# Patient Record
Sex: MALE | Race: WHITE | NOT HISPANIC OR LATINO | URBAN - METROPOLITAN AREA
[De-identification: names, ages, dates, MRNs, and addresses within clinical notes are randomized per-mention and may not be internally consistent; named-entity substitution may affect disease eponyms.]

---

## 2017-09-08 ENCOUNTER — FOLLOW UP ESTABLISHED (OUTPATIENT)
Dept: URBAN - METROPOLITAN AREA CLINIC 85 | Facility: CLINIC | Age: 82
End: 2017-09-08
Payer: COMMERCIAL

## 2017-09-08 DIAGNOSIS — H34.12 CENTRAL RETINAL ARTERY OCCLUSION, LEFT EYE: ICD-10-CM

## 2017-09-08 DIAGNOSIS — H04.123 TEAR FILM INSUFFICIENCY OF BILATERAL LACRIMAL GLANDS: ICD-10-CM

## 2017-09-08 DIAGNOSIS — H35.3132 NONEXUDATIVE MACULAR DEGENERATION, INTERMEDIATE DRY STAGE, BILATERAL: Primary | ICD-10-CM

## 2017-09-08 PROCEDURE — 92014 COMPRE OPH EXAM EST PT 1/>: CPT | Performed by: OPTOMETRIST

## 2017-09-08 PROCEDURE — 92134 CPTRZ OPH DX IMG PST SGM RTA: CPT | Performed by: OPTOMETRIST

## 2017-09-08 ASSESSMENT — INTRAOCULAR PRESSURE
OS: 15
OD: 15

## 2017-09-08 ASSESSMENT — VISUAL ACUITY
OD: 20/25
OS: 20/CF 4'

## 2021-03-12 ENCOUNTER — NEW PATIENT (OUTPATIENT)
Dept: URBAN - METROPOLITAN AREA CLINIC 85 | Facility: CLINIC | Age: 86
End: 2021-03-12
Payer: COMMERCIAL

## 2021-03-12 DIAGNOSIS — H02.053 TRICHIASIS WITHOUT ENTROPION RIGHT EYE, UNSPECIFIED EYELID: ICD-10-CM

## 2021-03-12 PROCEDURE — 92134 CPTRZ OPH DX IMG PST SGM RTA: CPT | Performed by: OPHTHALMOLOGY

## 2021-03-12 PROCEDURE — 92014 COMPRE OPH EXAM EST PT 1/>: CPT | Performed by: OPHTHALMOLOGY

## 2021-03-12 ASSESSMENT — VISUAL ACUITY
OS: 20/HM
OD: 20/40

## 2021-03-12 ASSESSMENT — INTRAOCULAR PRESSURE
OD: 15
OS: 15

## 2021-03-19 ENCOUNTER — ADULT PHYSICAL (OUTPATIENT)
Dept: URBAN - METROPOLITAN AREA CLINIC 85 | Facility: CLINIC | Age: 86
End: 2021-03-19
Payer: COMMERCIAL

## 2021-03-19 DIAGNOSIS — Z01.818 ENCOUNTER FOR OTHER PREPROCEDURAL EXAMINATION: Primary | ICD-10-CM

## 2021-03-19 DIAGNOSIS — H26.491 OTHER SECONDARY CATARACT, RIGHT EYE: ICD-10-CM

## 2021-03-19 PROCEDURE — 99203 OFFICE O/P NEW LOW 30 MIN: CPT | Performed by: PHYSICIAN ASSISTANT

## 2021-03-24 ENCOUNTER — SURGERY (OUTPATIENT)
Dept: URBAN - METROPOLITAN AREA SURGERY 55 | Facility: SURGERY | Age: 86
End: 2021-03-24
Payer: COMMERCIAL

## 2021-03-24 PROCEDURE — 66821 AFTER CATARACT LASER SURGERY: CPT | Performed by: OPHTHALMOLOGY

## 2021-04-14 ENCOUNTER — OFFICE VISIT (OUTPATIENT)
Dept: URBAN - METROPOLITAN AREA CLINIC 85 | Facility: CLINIC | Age: 86
End: 2021-04-14
Payer: COMMERCIAL

## 2021-04-14 DIAGNOSIS — H52.4 PRESBYOPIA: ICD-10-CM

## 2021-04-14 DIAGNOSIS — H53.8 OTHER VISUAL DISTURBANCES: Primary | ICD-10-CM

## 2021-04-14 PROCEDURE — 99212 OFFICE O/P EST SF 10 MIN: CPT | Performed by: OPHTHALMOLOGY

## 2021-04-14 ASSESSMENT — VISUAL ACUITY
OD: 20/50
OS: HM

## 2021-04-14 ASSESSMENT — INTRAOCULAR PRESSURE
OS: 13
OD: 13

## 2021-04-14 NOTE — IMPRESSION/PLAN
Impression: Presbyopia: H52.4. Plan: See above plan. Discussed monocular precautions with patient. Advised pt to wear protective eyewear and full-time wear of protective lenses recommended.

## 2021-04-14 NOTE — IMPRESSION/PLAN
Impression: Other visual disturbances: H53.8. (blurry vision) Plan: Discussed findings with patient. Monitor. New spectacle RX given. Day time driving only. RTC 6 months for CEE with 5-line OCT.

## 2021-09-28 ENCOUNTER — OFFICE VISIT (OUTPATIENT)
Dept: URBAN - METROPOLITAN AREA CLINIC 85 | Facility: CLINIC | Age: 86
End: 2021-09-28
Payer: COMMERCIAL

## 2021-09-28 DIAGNOSIS — H35.3131 BILATERAL NONEXUDATIVE AGE-RELATED MACULAR DEGENERATION, EARLY DRY STAGE: Primary | ICD-10-CM

## 2021-09-28 PROCEDURE — 92134 CPTRZ OPH DX IMG PST SGM RTA: CPT | Performed by: OPHTHALMOLOGY

## 2021-09-28 PROCEDURE — 92014 COMPRE OPH EXAM EST PT 1/>: CPT | Performed by: OPHTHALMOLOGY

## 2021-09-28 ASSESSMENT — VISUAL ACUITY
OS: CF
OD: 20/60

## 2021-09-28 ASSESSMENT — INTRAOCULAR PRESSURE
OS: 11
OD: 12

## 2021-09-28 NOTE — IMPRESSION/PLAN
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 Plan: Macular degeneration dry type. Patient was instructed on the use of the Amsler grid and will continue monitoring vision. Discussed variable rate of progression and potential of conversion to wet type. In the event condition converts to wet form, patient understands need for consult with retinal specialist regarding potential intervention. Discussed increased risk with smoking.  recommends taking AREDS II vitamins and antioxidants, provided the patient's primary care doctor approves  (They have been shown to have the potential of slowing the progression of macular degeneration.)  Patient given outline of AREDS II recommendations to give to primary care doctor before taking supplement. Patient given Amsler grid and instructed on proper use to monitor vision daily. Patient was told to call office immediately if patient experiences vision and/or Amsler grid changes.